# Patient Record
Sex: MALE | Race: OTHER | HISPANIC OR LATINO | URBAN - METROPOLITAN AREA
[De-identification: names, ages, dates, MRNs, and addresses within clinical notes are randomized per-mention and may not be internally consistent; named-entity substitution may affect disease eponyms.]

---

## 2024-06-19 ENCOUNTER — EMERGENCY (EMERGENCY)
Facility: HOSPITAL | Age: 19
LOS: 1 days | Discharge: HOME | End: 2024-06-19
Attending: STUDENT IN AN ORGANIZED HEALTH CARE EDUCATION/TRAINING PROGRAM
Payer: SELF-PAY

## 2024-06-19 VITALS
SYSTOLIC BLOOD PRESSURE: 127 MMHG | OXYGEN SATURATION: 98 % | HEART RATE: 104 BPM | DIASTOLIC BLOOD PRESSURE: 80 MMHG | HEIGHT: 66.93 IN | RESPIRATION RATE: 19 BRPM | TEMPERATURE: 98 F | WEIGHT: 145.06 LBS

## 2024-06-19 PROCEDURE — 99285 EMERGENCY DEPT VISIT HI MDM: CPT

## 2024-06-20 VITALS
HEART RATE: 88 BPM | SYSTOLIC BLOOD PRESSURE: 115 MMHG | RESPIRATION RATE: 18 BRPM | OXYGEN SATURATION: 99 % | DIASTOLIC BLOOD PRESSURE: 81 MMHG | TEMPERATURE: 98 F

## 2024-06-20 LAB
ALBUMIN SERPL ELPH-MCNC: 4.8 G/DL — SIGNIFICANT CHANGE UP (ref 3.3–5)
ALP SERPL-CCNC: 95 U/L — SIGNIFICANT CHANGE UP (ref 40–120)
ALT FLD-CCNC: 19 U/L — SIGNIFICANT CHANGE UP (ref 10–45)
ANION GAP SERPL CALC-SCNC: 15 MMOL/L — SIGNIFICANT CHANGE UP (ref 5–17)
AST SERPL-CCNC: 16 U/L — SIGNIFICANT CHANGE UP (ref 10–40)
BASE EXCESS BLDV CALC-SCNC: 1.8 MMOL/L — SIGNIFICANT CHANGE UP (ref -2–3)
BASOPHILS # BLD AUTO: 0.05 K/UL — SIGNIFICANT CHANGE UP (ref 0–0.2)
BASOPHILS NFR BLD AUTO: 0.5 % — SIGNIFICANT CHANGE UP (ref 0–2)
BILIRUB SERPL-MCNC: 0.4 MG/DL — SIGNIFICANT CHANGE UP (ref 0.2–1.2)
BUN SERPL-MCNC: 13 MG/DL — SIGNIFICANT CHANGE UP (ref 7–23)
CA-I SERPL-SCNC: 1.24 MMOL/L — SIGNIFICANT CHANGE UP (ref 1.15–1.33)
CALCIUM SERPL-MCNC: 9.5 MG/DL — SIGNIFICANT CHANGE UP (ref 8.4–10.5)
CHLORIDE BLDV-SCNC: 102 MMOL/L — SIGNIFICANT CHANGE UP (ref 96–108)
CHLORIDE SERPL-SCNC: 102 MMOL/L — SIGNIFICANT CHANGE UP (ref 96–108)
CO2 BLDV-SCNC: 30 MMOL/L — HIGH (ref 22–26)
CO2 SERPL-SCNC: 23 MMOL/L — SIGNIFICANT CHANGE UP (ref 22–31)
CREAT SERPL-MCNC: 0.89 MG/DL — SIGNIFICANT CHANGE UP (ref 0.5–1.3)
EGFR: 126 ML/MIN/1.73M2 — SIGNIFICANT CHANGE UP
EOSINOPHIL # BLD AUTO: 0.74 K/UL — HIGH (ref 0–0.5)
EOSINOPHIL NFR BLD AUTO: 7.9 % — HIGH (ref 0–6)
GAS PNL BLDV: 135 MMOL/L — LOW (ref 136–145)
GAS PNL BLDV: SIGNIFICANT CHANGE UP
GAS PNL BLDV: SIGNIFICANT CHANGE UP
GLUCOSE BLDV-MCNC: 83 MG/DL — SIGNIFICANT CHANGE UP (ref 70–99)
GLUCOSE SERPL-MCNC: 89 MG/DL — SIGNIFICANT CHANGE UP (ref 70–99)
HCO3 BLDV-SCNC: 29 MMOL/L — SIGNIFICANT CHANGE UP (ref 22–29)
HCT VFR BLD CALC: 46.3 % — SIGNIFICANT CHANGE UP (ref 39–50)
HCT VFR BLDA CALC: 48 % — SIGNIFICANT CHANGE UP (ref 39–51)
HGB BLD CALC-MCNC: 16 G/DL — SIGNIFICANT CHANGE UP (ref 12.6–17.4)
HGB BLD-MCNC: 15.6 G/DL — SIGNIFICANT CHANGE UP (ref 13–17)
HOROWITZ INDEX BLDV+IHG-RTO: SIGNIFICANT CHANGE UP
IMM GRANULOCYTES NFR BLD AUTO: 0.4 % — SIGNIFICANT CHANGE UP (ref 0–0.9)
LACTATE BLDV-MCNC: 1.1 MMOL/L — SIGNIFICANT CHANGE UP (ref 0.5–2)
LYMPHOCYTES # BLD AUTO: 3.56 K/UL — HIGH (ref 1–3.3)
LYMPHOCYTES # BLD AUTO: 38 % — SIGNIFICANT CHANGE UP (ref 13–44)
MCHC RBC-ENTMCNC: 29.6 PG — SIGNIFICANT CHANGE UP (ref 27–34)
MCHC RBC-ENTMCNC: 33.7 GM/DL — SIGNIFICANT CHANGE UP (ref 32–36)
MCV RBC AUTO: 87.9 FL — SIGNIFICANT CHANGE UP (ref 80–100)
MONOCYTES # BLD AUTO: 0.63 K/UL — SIGNIFICANT CHANGE UP (ref 0–0.9)
MONOCYTES NFR BLD AUTO: 6.7 % — SIGNIFICANT CHANGE UP (ref 2–14)
NEUTROPHILS # BLD AUTO: 4.36 K/UL — SIGNIFICANT CHANGE UP (ref 1.8–7.4)
NEUTROPHILS NFR BLD AUTO: 46.5 % — SIGNIFICANT CHANGE UP (ref 43–77)
NRBC # BLD: 0 /100 WBCS — SIGNIFICANT CHANGE UP (ref 0–0)
PCO2 BLDV: 52 MMHG — SIGNIFICANT CHANGE UP (ref 42–55)
PH BLDV: 7.35 — SIGNIFICANT CHANGE UP (ref 7.32–7.43)
PLATELET # BLD AUTO: 287 K/UL — SIGNIFICANT CHANGE UP (ref 150–400)
PO2 BLDV: 26 MMHG — SIGNIFICANT CHANGE UP (ref 25–45)
POTASSIUM BLDV-SCNC: 3.9 MMOL/L — SIGNIFICANT CHANGE UP (ref 3.5–5.1)
POTASSIUM SERPL-MCNC: 3.9 MMOL/L — SIGNIFICANT CHANGE UP (ref 3.5–5.3)
POTASSIUM SERPL-SCNC: 3.9 MMOL/L — SIGNIFICANT CHANGE UP (ref 3.5–5.3)
PROT SERPL-MCNC: 7.6 G/DL — SIGNIFICANT CHANGE UP (ref 6–8.3)
RBC # BLD: 5.27 M/UL — SIGNIFICANT CHANGE UP (ref 4.2–5.8)
RBC # FLD: 12.3 % — SIGNIFICANT CHANGE UP (ref 10.3–14.5)
SAO2 % BLDV: 40.8 % — LOW (ref 67–88)
SODIUM SERPL-SCNC: 140 MMOL/L — SIGNIFICANT CHANGE UP (ref 135–145)
WBC # BLD: 9.38 K/UL — SIGNIFICANT CHANGE UP (ref 3.8–10.5)
WBC # FLD AUTO: 9.38 K/UL — SIGNIFICANT CHANGE UP (ref 3.8–10.5)

## 2024-06-20 PROCEDURE — 80053 COMPREHEN METABOLIC PANEL: CPT

## 2024-06-20 PROCEDURE — 82435 ASSAY OF BLOOD CHLORIDE: CPT

## 2024-06-20 PROCEDURE — 73620 X-RAY EXAM OF FOOT: CPT | Mod: 26,50

## 2024-06-20 PROCEDURE — 90715 TDAP VACCINE 7 YRS/> IM: CPT

## 2024-06-20 PROCEDURE — 11730 AVULSION NAIL PLATE SIMPLE 1: CPT

## 2024-06-20 PROCEDURE — 84295 ASSAY OF SERUM SODIUM: CPT

## 2024-06-20 PROCEDURE — 82330 ASSAY OF CALCIUM: CPT

## 2024-06-20 PROCEDURE — 82803 BLOOD GASES ANY COMBINATION: CPT

## 2024-06-20 PROCEDURE — 73620 X-RAY EXAM OF FOOT: CPT

## 2024-06-20 PROCEDURE — 85018 HEMOGLOBIN: CPT

## 2024-06-20 PROCEDURE — 83605 ASSAY OF LACTIC ACID: CPT

## 2024-06-20 PROCEDURE — 85014 HEMATOCRIT: CPT

## 2024-06-20 PROCEDURE — 99284 EMERGENCY DEPT VISIT MOD MDM: CPT | Mod: 25

## 2024-06-20 PROCEDURE — 85025 COMPLETE CBC W/AUTO DIFF WBC: CPT

## 2024-06-20 PROCEDURE — 87040 BLOOD CULTURE FOR BACTERIA: CPT

## 2024-06-20 PROCEDURE — 84132 ASSAY OF SERUM POTASSIUM: CPT

## 2024-06-20 PROCEDURE — T1013: CPT

## 2024-06-20 PROCEDURE — 82947 ASSAY GLUCOSE BLOOD QUANT: CPT

## 2024-06-20 RX ORDER — LIDOCAINE HCL 20 MG/ML
20 VIAL (ML) INJECTION ONCE
Refills: 0 | Status: ACTIVE | OUTPATIENT
Start: 2024-06-20 | End: 2024-06-20

## 2024-06-20 RX ORDER — MUPIROCIN 20 MG/G
1 OINTMENT TOPICAL
Qty: 1 | Refills: 0
Start: 2024-06-20 | End: 2024-06-29

## 2024-06-20 RX ORDER — TETANUS TOXOID, REDUCED DIPHTHERIA TOXOID AND ACELLULAR PERTUSSIS VACCINE, ADSORBED 5; 2.5; 8; 8; 2.5 [IU]/.5ML; [IU]/.5ML; UG/.5ML; UG/.5ML; UG/.5ML
0.5 SUSPENSION INTRAMUSCULAR ONCE
Refills: 0 | Status: COMPLETED | OUTPATIENT
Start: 2024-06-20 | End: 2024-06-20

## 2024-06-20 RX ORDER — CEPHALEXIN 500 MG
1 CAPSULE ORAL
Qty: 30 | Refills: 0
Start: 2024-06-20 | End: 2024-06-29

## 2024-06-20 RX ORDER — AMPICILLIN SODIUM AND SULBACTAM SODIUM 250; 125 MG/ML; MG/ML
3 INJECTION, POWDER, FOR SUSPENSION INTRAMUSCULAR; INTRAVENOUS ONCE
Refills: 0 | Status: COMPLETED | OUTPATIENT
Start: 2024-06-20 | End: 2024-06-20

## 2024-06-20 RX ORDER — ACETAMINOPHEN 500 MG
1000 TABLET ORAL ONCE
Refills: 0 | Status: COMPLETED | OUTPATIENT
Start: 2024-06-20 | End: 2024-06-20

## 2024-06-20 RX ADMIN — TETANUS TOXOID, REDUCED DIPHTHERIA TOXOID AND ACELLULAR PERTUSSIS VACCINE, ADSORBED 0.5 MILLILITER(S): 5; 2.5; 8; 8; 2.5 SUSPENSION INTRAMUSCULAR at 02:28

## 2024-06-20 RX ADMIN — AMPICILLIN SODIUM AND SULBACTAM SODIUM 200 GRAM(S): 250; 125 INJECTION, POWDER, FOR SUSPENSION INTRAMUSCULAR; INTRAVENOUS at 03:37

## 2024-06-20 RX ADMIN — Medication 400 MILLIGRAM(S): at 02:29

## 2024-06-20 NOTE — ED PROVIDER NOTE - OBJECTIVE STATEMENT
17 y/o male, presents for the ER for b/l toe pain. states he was cutting his toe nails a week ago. noticed his right and left first toe started hurting. states then noticed both were swollen and appeared infected. states has been going on for a week. does not know if he is up to date on his tetanus vaccine. patient states toes are hurting him at this time. denies f/n/v/d, CP, SOB, HA, dizziness.

## 2024-06-20 NOTE — ED ADULT NURSE NOTE - OBJECTIVE STATEMENT
18y M AxO x4 came in for bilateral toe pain. Patient reports that he trimmed his toenails one week ago and noticed redness and pain. Patient reports that pain has only gotten worse since trimming his toenails. Patient has cuts bilaterally on both first toes. Full ROM and sensation x4 extremities. Denies PMH. Denies fever, chills, headache, blurry vision, dizziness, n/v/d, dysuria, weakness, numbness, tingling, SOB, and chest pain. Bed is in lowest position.

## 2024-06-20 NOTE — ED PROVIDER NOTE - PHYSICAL EXAMINATION
MSK: right and left first toes noted to be swollen. +erythema noted. +warmth presents. cuticle areas noted to have scabbing and dry blood. no bleeding present.

## 2024-06-20 NOTE — ED PROVIDER NOTE - NSFOLLOWUPINSTRUCTIONS_ED_ALL_ED_FT
1. TOME TODOS LOS MEDICAMENTOS SEGÚN LAS INSTRUCCIONES.    2. PARA EL DOLOR O LA FIEBRE PUEDE DANETTE IBUPROFENO (MOTRIN, ADVIL) 600 mg cada 6 horas O ACETAMINOFEN (TYLENOL) 975 mg cada 6 horas SEGÚN LO NECESITE, SEGÚN LAS INDICACIONES EN EL EMBALAJE.  3. PAT UN SEGUIMIENTO CON ROACH MÉDICO PRINCIPAL DENTRO DE LOS 5 DÍAS SEGÚN LO INDICADO.  4. SI LE REALIZARON ANÁLISIS O IMÁGENES, LE DIERON COPIAS DE TODOS LOS RESULTADOS DE LOS ANÁLISIS Y/O IMÁGENES DE ROACH VISITA A LA ER. LLEVÉLOS CON USTED A LEILA CITAS DE SEGUIMIENTO.   Para obtener emily copia de roach registro o disco médico, comuníquese con registros médicos lashonda el horario de atención (lunes a viernes de 8 a. m. a 7 p. m.; sábados de 8 a. m. a 4 p. m.) al teléfono: [phone]   5. REGRESE A LA Urgencias ante cualquier empeoramiento de los síntomas o inquietudes.    ¿Qué es emily uña encarnada?  Emily uña encarnada ocurre cuando el costado o la esquina de la uña crece hacia la carne que la rodea. Suele afectar al dedo sangeeta del pie.    ¿Cuáles son los síntomas de emily uña encarnada?  Los síntomas incluyen dolor, enrojecimiento e hinchazón donde la uña ha crecido hacia la carne.    ¿Existe alguna prueba para detectar emily uña encarnada?  No. Roach médico o enfermera deberían poder saber si lo tiene al conocer leila síntomas y realizar un examen.    ¿Hay algo que pueda hacer por mi cuenta para sentirme mejor?  Sí. Algunas personas se sienten mejor si:    ?Coloque un pequeño trozo de algodón o hilo dental debajo de la uña para aliviar la presión del dedo (figura 1).    ?Remoje el pie en agua tibia y jabón. Pat esto lashonda 10 a 20 minutos, 2 a 3 veces al día lashonda 1 a 2 semanas. También puedes utilizar de 1 a 2 cucharaditas de sales de Epsom (disponibles en farmacias) en el agua en lugar de jabón.    ¿Aarti consultar a un médico o emily enfermera?  Consulte a roach médico o enfermera si el enrojecimiento y la hinchazón empeoran y hay pus.    ¿Cómo se trata emily uña encarnada?  Si los tratamientos que probó por roach cuenta no ayudan, roach médico podría cortarle parte de la uña del pie. Christie le inyectarán un medicamento para adormecerle el dedo del pie. Después, necesitarás:    ?Limpie el área de 2 a 3 veces al día. Pat emily mezcla de partes iguales de agua y peróxido de hidrógeno y frótela en el dedo del pie con un hisopo de algodón.    ?Póngase un ungüento antibiótico en el dedo del pie. Los ejemplos incluyen bacitracina y mupirocina (nombre de bao: Bactroban).    ¿Se puede prevenir emily uña encarnada?  Puede reducir leila posibilidades de tener emily uña encarnada si:    ?Use zapatos que no le aprieten demasiado los dedos de los pies.    ?Córtate las uñas de los pies en línea recta y no demasiado cortas. 1. El paciente debe mantener el vendaje limpio, seco e intacto lashonda 24 horas y luego comenzar a remojarlo en agua salada de Epson.  - Se le indicó al paciente que regresara al servicio de urgencias si se presenta algún síntoma, incluido enrojecimiento, hinchazón, purulencia, dolor desproporcionado o fiebre/náuseas/escalofríos; el paciente mostró comprensión verbal.   - Paciente estable para jacob y seguimiento ambulatorio con el Dr. Farr dentro de EMILY semana a través del [phone] o [phone]  2. PARA EL DOLOR O LA FIEBRE PUEDE DANETTE IBUPROFENO (MOTRIN, ADVIL) 600 mg cada 6 horas O ACETAMINOFEN (TYLENOL) 975 mg cada 6 horas SEGÚN LO NECESITE, SEGÚN LAS INDICACIONES EN EL EMBALAJE.  3. PAT UN SEGUIMIENTO CON CURTIS MÉDICO PRINCIPAL DENTRO DE LOS 5 DÍAS SEGÚN LO INDICADO.  4. SI LE REALIZARON ANÁLISIS O IMÁGENES, LE DIERON COPIAS DE TODOS LOS RESULTADOS DE LOS ANÁLISIS Y/O IMÁGENES DE CURTIS VISITA A LA ER. LLEVÉLOS CON USTED A LEILA CITAS DE SEGUIMIENTO.   Para obtener emily copia de curtis registro o disco médico, comuníquese con registros médicos lashonda el horario de atención (lunes a viernes de 8 a. m. a 7 p. m.; sábados de 8 a. m. a 4 p. m.) al teléfono: [phone]   5. REGRESE A LA Urgencias ante cualquier empeoramiento de los síntomas o inquietudes.    ¿Qué es emily uña encarnada?  Emiyl uña encarnada ocurre cuando el costado o la esquina de la uña crece hacia la carne que la rodea. Suele afectar al dedo sangeeta del pie.    ¿Cuáles son los síntomas de emily uña encarnada?  Los síntomas incluyen dolor, enrojecimiento e hinchazón donde la uña ha crecido hacia la carne.    ¿Existe alguna prueba para detectar emily uña encarnada?  No. Curtis médico o enfermera deberían poder saber si lo tiene al conocer leila síntomas y realizar un examen.    ¿Hay algo que pueda hacer por mi cuenta para sentirme mejor?  Sí. Algunas personas se sienten mejor si:    ?Coloque un pequeño trozo de algodón o hilo dental debajo de la uña para aliviar la presión del dedo (figura 1).    ?Remoje el pie en agua tibia y jabón. Pat esto lashonda 10 a 20 minutos, 2 a 3 veces al día lashonda 1 a 2 semanas. También puedes utilizar de 1 a 2 cucharaditas de sales de Epsom (disponibles en farmacias) en el agua en lugar de [facility].    ¿Aarti consultar a un médico o emily enfermera?  Consulte a curtis médico o enfermera si el enrojecimiento y la hinchazón empeoran y hay pus.    ¿Cómo se trata emily uña encarnada?  Si los tratamientos que probó por curtis cuenta no ayudan, curtis médico podría cortarle parte de la uña del pie. Christie le inyectarán un medicamento para adormecerle el dedo del pie. Después, necesitarás:    ?Limpie el área de 2 a 3 veces al día. Pat emily mezcla de partes iguales de agua y peróxido de hidrógeno y frótela en el dedo del pie con un hisopo de algodón.    ?Póngase un ungüento antibiótico en el dedo del pie. Los ejemplos incluyen bacitracina y mupirocina (nombre de bao: Bactroban).    ¿Se puede prevenir emily uña encarnada?  Puede reducir leila posibilidades de tener emily uña encarnada si:    ?Use zapatos que no le aprieten demasiado los dedos de los pies.    ?Córtate las uñas de los pies en línea recta y no demasiado cortas.

## 2024-06-20 NOTE — ED PROVIDER NOTE - PATIENT PORTAL LINK FT
You can access the FollowMyHealth Patient Portal offered by [facility] by registering at the following website: [email or web address]. By joining [facility]’s FollowMyHealth portal, you will also be able to view your health information using other applications (apps) compatible with our system.

## 2024-06-20 NOTE — ED PROVIDER NOTE - ATTENDING APP SHARED VISIT CONTRIBUTION OF CARE
I, Rene Stallings, performed a history and physical exam of the patient and discussed their management with the resident and/or advanced care provider. I reviewed the resident and/or advanced care provider's note and agree with the documented findings and plan of care. I was present and available for all procedures.    17 y/o male, presents for the ER for b/l toe pain. states he was cutting his toe nails a week ago. noticed his right and left first toe started hurting. states then noticed both were swollen and appeared infected. states has been going on for a week. does not know if he is up to date on his tetanus vaccine. patient states toes are hurting him at this time. denies f/n/v/d, CP, SOB, HA, dizziness.    Gen: Well appearing and in NAD  Head: normal appearing atraumatic   Neck: trachea midline  Resp:  No respiratory distress  Abd; soft NT ND  Ext: Bilateral hallux ingrown toenails more severe on the right lateral border no ascending and proximal infection signs and symptoms neurovascular intact proximally  Neuro:  Alert and oriented, appears non focal  Skin:  Warm and dry as visualized  Psych:  Normal affect and mood    Patient presenting with ingrown toe nails on bilateral lower extremity needs evaluation and treatment by podiatry otherwise follow-up unlikely ascending infection discussed with patient and family at bedside agreeable plan

## 2024-06-20 NOTE — CONSULT NOTE ADULT - SUBJECTIVE AND OBJECTIVE BOX
CLAUDIA ZAMBRANO  [id number] 18yM   --------------------------------------    · Chief Complaint: The patient is a 18y Male complaining of toe pain.  · HPI Objective Statement: 19 y/o male, presents for the ER for b/l toe pain. states he was cutting his toe nails a week ago. noticed his right and left first toe started hurting. states then noticed both were swollen and appeared infected. states has been going on for a week. does not know if he is up to date on his tetanus vaccine. patient states toes are hurting him at this time. denies f/n/v/d, CP, SOB, HA, dizziness.        PAST MEDICAL & SURGICAL HISTORY:      MEDICATIONS  (STANDING):  lidocaine 1% Injectable 20 milliLiter(s) Local Injection Once    MEDICATIONS  (PRN):      Allergies    No Known Allergies    Intolerances        --------------------------------------  VITALS:    Vital Signs Last 24 Hrs  T(C): 36.7 (20 Jun 2024 02:20), Max: 36.8 (19 Jun 2024 21:27)  T(F): 98.1 (20 Jun 2024 02:20), Max: 98.3 (19 Jun 2024 21:27)  HR: 93 (20 Jun 2024 02:20) (93 - 104)  BP: 125/73 (20 Jun 2024 02:20) (125/73 - 127/80)  BP(mean): --  RR: 18 (20 Jun 2024 02:20) (18 - 19)  SpO2: 99% (20 Jun 2024 02:20) (98% - 99%)    Parameters below as of 20 Jun 2024 02:20  Patient On (Oxygen Delivery Method): room air        --------------------------------------  LABS:                          15.6   9.38  )-----------( 287      ( 20 Jun 2024 02:38 )             46.3       06-20    140  |  102  |  13  ----------------------------<  89  3.9   |  23  |  0.89    Ca    9.5      20 Jun 2024 02:38    TPro  7.6  /  Alb  4.8  /  TBili  0.4  /  DBili  x   /  AST  16  /  ALT  19  /  AlkPhos  95  06-20      CAPILLARY BLOOD GLUCOSE              LOWER EXTREMITY PHYSICAL EXAM:    Vascular: DP/PT 2/4, B/L, CFT < 3seconds B/L, Temperature gradient warm to cool, B/L.   Neuro: Epicritic sensation intact to the level of digit , B/L.  Musculoskeletal/Ortho: unremarkable   Skin: L foot hallux nail lateral boarder ingrown toe nail with mild granuloma formation, scant serosanguineous drainage, no purulence, R foot hallux nail bilateral boarder ingrown nail with moderate granuloma formation, lateral boarder worse, lateral boarder presents scant purulence, medial boarder clear drainage with serosanguineous drainage mix,.     RADIOLOGY & ADDITIONAL STUDIES:

## 2024-06-20 NOTE — CONSULT NOTE ADULT - ASSESSMENT
18y Male with s/p bedside L foot hallux lateral boarder, R foot hallux bilateral boarder partial nail avulsion   - Patient seen and evaluated,  [id number], Japanese   - Afebrile, WBC 9.38  - L foot hallux nail lateral boarder ingrown toe nail with mild granuloma formation, scant serosanguineous drainage, no purulence, R foot hallux nail bilateral boarder ingrown nail with moderate granuloma formation, lateral boarder worse, lateral boarder presents scant purulence, medial boarder clear drainage with serosanguineous drainage mix,.   - - Verbal consent obtained, L foot and R foot hallux prepped with alcohol pad, anesthetized via 10 cc of 1% lidocaine plain. Using sterile fine suture kit and suture removal kit, L foot hallux nail lateral boarder and R foot hallux nail lateral and medial boarder removed in atraumatic manner. Procedural site then irrigated with copious sterile saline, dressed with Bacitracin followed by 4x4 gauze and Arvin. Patient tolerated well.   - Recommended Keflex for antitbiocs, 10 days   - Patient to keep dressing Clean Dry and Intact for 24 hours, then begin soaks in Epson salt water  - Instructed patient to return to ED if any symptoms including redness, swelling, purulence pain out of proportion or fever/nausea/chill occur, patient displayed verbal understanding.   - Patient stable for discharge and follow up outpatient with Dr. Farr within ONE week via [phone] or [phone]   - Discussed with attending.

## 2024-06-25 LAB
CULTURE RESULTS: SIGNIFICANT CHANGE UP
CULTURE RESULTS: SIGNIFICANT CHANGE UP
SPECIMEN SOURCE: SIGNIFICANT CHANGE UP
SPECIMEN SOURCE: SIGNIFICANT CHANGE UP

## 2025-03-03 NOTE — ED ADULT NURSE NOTE - IN THE PAST 12 MONTHS HAVE YOU USED DRUGS OTHER THAN THOSE REQUIRED FOR MEDICAL REASON?
Colonoscopy   WHAT YOU NEED TO KNOW:   A colonoscopy is a procedure to examine the inside of your colon (intestine) with a scope. Polyps or tissue growths may have been removed during your colonoscopy. It is normal to feel bloated and to have some abdominal discomfort. You should be passing gas. If you have hemorrhoids or you had polyps removed, you may have a small amount of bleeding.        DISCHARGE INSTRUCTIONS:   Seek care immediately if:   You have sudden, severe abdominal pain.     You have problems swallowing.     You have a large amount of black, sticky bowel movements or blood in your bowel movements.     You have sudden trouble breathing.     You feel weak, lightheaded, or faint or your heart beats faster than normal for you.     Contact your healthcare provider if:   You have a fever and chills.      You have nausea or are vomiting.      Your abdomen is bloated or feels full and hard.     You have abdominal pain.   You have black, sticky bowel movements or blood in your bowel movements.  You have not had a bowel movement for 3 days after your procedure.  You have rash or hives.  You have questions or concerns about your procedure.    Activity:   Do not lift, strain, or run for 24 hours after your procedure.     Rest after your procedure. You have been given medicine to relax you. Do not drive or make important decisions until the day after your procedure. Return to your normal activity as directed.     Relieve gas and discomfort from bloating by lying on your right side with a heating pad on your abdomen. You may need to take short walks to help the gas move out. Eat small meals until bloating is relieved.  Follow up with your healthcare provider as directed: Write down your questions so you remember to ask them during your visits.     If you take a “blood thinner”, please review the specific instructions from your endoscopist about when you should resume it. These can be found in the “Recommendation”  and “Your Medication list” sections of this After Visit Summary.       No